# Patient Record
Sex: MALE | Race: BLACK OR AFRICAN AMERICAN | Employment: UNEMPLOYED | URBAN - METROPOLITAN AREA
[De-identification: names, ages, dates, MRNs, and addresses within clinical notes are randomized per-mention and may not be internally consistent; named-entity substitution may affect disease eponyms.]

---

## 2022-10-24 ENCOUNTER — HOSPITAL ENCOUNTER (EMERGENCY)
Age: 57
Discharge: HOME | End: 2022-10-24
Payer: COMMERCIAL

## 2022-10-24 VITALS
WEIGHT: 143.3 LBS | SYSTOLIC BLOOD PRESSURE: 160 MMHG | TEMPERATURE: 98.7 F | OXYGEN SATURATION: 100 % | RESPIRATION RATE: 20 BRPM | HEART RATE: 80 BPM | DIASTOLIC BLOOD PRESSURE: 81 MMHG

## 2022-10-24 DIAGNOSIS — H10.32 ACUTE CONJUNCTIVITIS OF LEFT EYE, UNSPECIFIED ACUTE CONJUNCTIVITIS TYPE: Primary | ICD-10-CM

## 2022-10-24 PROCEDURE — 99283 EMERGENCY DEPT VISIT LOW MDM: CPT

## 2022-10-24 RX ORDER — SULFACETAMIDE SODIUM 100 MG/ML
2 SOLUTION/ DROPS OPHTHALMIC
Qty: 1 EACH | Refills: 0 | Status: SHIPPED | OUTPATIENT
Start: 2022-10-24 | End: 2022-11-03

## 2022-10-24 ASSESSMENT — PAIN - FUNCTIONAL ASSESSMENT
PAIN_FUNCTIONAL_ASSESSMENT: NONE - DENIES PAIN
PAIN_FUNCTIONAL_ASSESSMENT: NONE - DENIES PAIN

## 2022-10-24 NOTE — ED PROVIDER NOTES
**ADVANCED PRACTICE PROVIDER, I HAVE EVALUATED THIS PATIENT**        [address] [facility]      Pt Name: Gina Brown  SYJ:[id number]  [place] 1965  Date of evaluation: 10/24/2022  Provider: Ramses Zabala PA-C      Chief Complaint:    Chief Complaint   Patient presents with    Eye Drainage     Pt states \" sleepy eye and its watering\"         Nursing Notes, Past Medical Hx, Past Surgical Hx, Social Hx, Allergies, and Family Hx were all reviewed and agreed with or any disagreements were addressed in the HPI.    HPI: (Location, Duration, Timing, Severity, Quality, Assoc Sx, Context, Modifying factors)    Chief Complaint of left eye changes    This is a  56 y.o. male who presents denying any injury to the left eye or any real pain there. He states that is just goopy and watery and a bit red and slightly irritated the last couple of days or so. No known exposure to someone else who had similar issues. No acute vision loss fevers chills cough congestion runny or stuffy or other acute complaint. PastMedical/Surgical History:  No past medical history on file. No past surgical history on file. Medications:  Previous Medications    No medications on file         Review of Systems:  (2-9 systems needed)  Review of Systems  Positive history as above no acute photophobia or decreased or blurry vision, no recent fall or contusion or abrasion. No recent runny or stuffy nose headache or neck pain shortness of breath or chest pain abdominal pain or extremity acute weakness. No rash. \"Positives and Pertinent negatives as per HPI\"    Physical Exam:  Physical Exam  Vitals and nursing note reviewed. Constitutional:       Appearance: Normal appearance. He is not diaphoretic. HENT:      Head: Normocephalic and atraumatic.       Right Ear: External ear normal.      Left Ear: External ear normal.      Nose: Nose normal.   Eyes:      General: Lids are normal. Lids are everted, no foreign bodies appreciated. Vision grossly intact. Gaze aligned appropriately. Right eye: No discharge. Left eye: Discharge present. Conjunctiva/sclera:      Right eye: Right conjunctiva is not injected. No chemosis or exudate. Left eye: Left conjunctiva is injected. No chemosis. Pupils:      Right eye: Pupil is round, reactive and not sluggish. Left eye: Pupil is round, reactive and not sluggish. Comments: Some watering and minimal mucus production in left eye compared to right   Pulmonary:      Effort: Pulmonary effort is normal. No respiratory distress. Musculoskeletal:      Cervical back: Normal range of motion and neck supple. Skin:     General: Skin is warm and dry. Capillary Refill: Capillary refill takes less than 2 seconds. Neurological:      Mental Status: He is alert and oriented to person, place, and time. Mental status is at baseline. Psychiatric:         Mood and Affect: Mood normal.         Behavior: Behavior normal.       MEDICAL DECISION MAKING    Vitals:    Vitals:    10/24/22 1612   BP: (!) 160/81   Pulse: 80   Resp: 20   Temp: 98.7 °F (37.1 °C)   TempSrc: Oral   SpO2: 100%   Weight: 143 lb 4.8 oz (65 kg)       LABS:Labs Reviewed - No data to display     Remainder of labs reviewed and were negative at this time or not returned at the time of this note. RADIOLOGY:   Non-plain film images such as CT, Ultrasound and MRI are read by the radiologist. Rishi Cisneros PA-C have directly visualized the radiologic plain film image(s) with the below findings:      Interpretation per the Radiologist below, if available at the time of this note:    No orders to display        No results found. MEDICAL DECISION MAKING / ED COURSE:      PROCEDURES:   Procedures    None    Patient was given:  Medications - No data to display    Patient presents for evaluation and treatment is begun here.   He does have evidence of mild acute conjunctivitis in the left eye. He does not use contacts. He is cautioned about appropriate conservative home care and we will write for ophthalmologic antibiotic drops for the left eye. He verbalizes understanding and agreement with the above and the following plan. Use cool compresses, use the medication as written, and wash hands frequently, making sure not to rub the left eye than the right eye. Monitor for gradual improvement. Follow-up outpatient with [facility] for further care and treatment if needed. Return to the emergency department for any emergency worsening or concern. The patient tolerated their visit well. I evaluated the patient. The physician was available for consultation as needed. The patient and / or the family were informed of the results of any tests, a time was given to answer questions, a plan was proposed and they agreed with plan. I am the Primary Clinician of Record. CLINICAL IMPRESSION:  1.  Acute conjunctivitis of left eye, unspecified acute conjunctivitis type        DISPOSITION Decision To Discharge 10/24/2022 05:03:31 PM      PATIENT REFERRED TO:  [address] [place] [address]    Call today  Urgent [address] [facility] [phone] for further care and treatment in the next couple of days as needed      DISCHARGE MEDICATIONS:  New Prescriptions    SULFACETAMIDE (BLEPH-10) 10 % OPHTHALMIC SOLUTION    Place 2 drops into the left eye every 3 hours for 10 days       DISCONTINUED MEDICATIONS:  Discontinued Medications    No medications on file              (Please note the MDM and HPI sections of this note were completed with a voice recognition program.  Efforts were made to edit the dictations but occasionally words are mis-transcribed.)    Electronically signed, Gregoria Lee PA-C,          Gregoria Lee PA-C  10/25/22 9516

## 2022-10-25 ASSESSMENT — VISUAL ACUITY: OU: 1

## 2022-12-27 ENCOUNTER — HOSPITAL ENCOUNTER (OUTPATIENT)
Dept: PET IMAGING | Age: 57
Discharge: HOME | End: 2022-12-27
Payer: COMMERCIAL

## 2022-12-27 DIAGNOSIS — R91.1 LUNG NODULE: ICD-10-CM

## 2022-12-27 PROCEDURE — A9552 F18 FDG: HCPCS | Performed by: STUDENT IN AN ORGANIZED HEALTH CARE EDUCATION/TRAINING PROGRAM

## 2022-12-27 PROCEDURE — 78815 PET IMAGE W/CT SKULL-THIGH: CPT

## 2022-12-27 PROCEDURE — 3430000000 HC RX DIAGNOSTIC RADIOPHARMACEUTICAL: Performed by: STUDENT IN AN ORGANIZED HEALTH CARE EDUCATION/TRAINING PROGRAM

## 2022-12-27 RX ORDER — FLUDEOXYGLUCOSE F 18 200 MCI/ML
16.6 INJECTION, SOLUTION INTRAVENOUS
Status: COMPLETED | OUTPATIENT
Start: 2022-12-27 | End: 2022-12-27

## 2022-12-27 RX ADMIN — FLUDEOXYGLUCOSE F 18 16.6 MILLICURIE: 200 INJECTION, SOLUTION INTRAVENOUS at 10:44
